# Patient Record
Sex: FEMALE | Race: AMERICAN INDIAN OR ALASKA NATIVE
[De-identification: names, ages, dates, MRNs, and addresses within clinical notes are randomized per-mention and may not be internally consistent; named-entity substitution may affect disease eponyms.]

---

## 2019-07-29 ENCOUNTER — HOSPITAL ENCOUNTER (EMERGENCY)
Dept: HOSPITAL 5 - ED | Age: 59
Discharge: HOME | End: 2019-07-29
Payer: SELF-PAY

## 2019-07-29 VITALS — SYSTOLIC BLOOD PRESSURE: 123 MMHG | DIASTOLIC BLOOD PRESSURE: 70 MMHG

## 2019-07-29 DIAGNOSIS — E11.65: Primary | ICD-10-CM

## 2019-07-29 LAB
BASOPHILS # (AUTO): 0.1 K/MM3 (ref 0–0.1)
BASOPHILS NFR BLD AUTO: 1.2 % (ref 0–1.8)
BILIRUB UR QL STRIP: (no result)
BLOOD UR QL VISUAL: (no result)
BUN SERPL-MCNC: 29 MG/DL (ref 7–17)
BUN/CREAT SERPL: 26 %
CALCIUM SERPL-MCNC: 8.7 MG/DL (ref 8.4–10.2)
EOSINOPHIL # BLD AUTO: 0 K/MM3 (ref 0–0.4)
EOSINOPHIL NFR BLD AUTO: 0.2 % (ref 0–4.3)
HCT VFR BLD CALC: 38.9 % (ref 30.3–42.9)
HEMOLYSIS INDEX: 13
HGB BLD-MCNC: 12.8 GM/DL (ref 10.1–14.3)
LYMPHOCYTES # BLD AUTO: 1.4 K/MM3 (ref 1.2–5.4)
LYMPHOCYTES NFR BLD AUTO: 29.6 % (ref 13.4–35)
MCHC RBC AUTO-ENTMCNC: 33 % (ref 30–34)
MCV RBC AUTO: 85 FL (ref 79–97)
MONOCYTES # (AUTO): 0.3 K/MM3 (ref 0–0.8)
MONOCYTES % (AUTO): 7.1 % (ref 0–7.3)
MUCOUS THREADS #/AREA URNS HPF: (no result) /HPF
PH UR STRIP: 6 [PH] (ref 5–7)
PLATELET # BLD: 256 K/MM3 (ref 140–440)
PROT UR STRIP-MCNC: (no result) MG/DL
RBC # BLD AUTO: 4.6 M/MM3 (ref 3.65–5.03)
RBC #/AREA URNS HPF: < 1 /HPF (ref 0–6)
UROBILINOGEN UR-MCNC: < 2 MG/DL (ref ?–2)
WBC #/AREA URNS HPF: 1 /HPF (ref 0–6)

## 2019-07-29 PROCEDURE — 96361 HYDRATE IV INFUSION ADD-ON: CPT

## 2019-07-29 PROCEDURE — 82805 BLOOD GASES W/O2 SATURATION: CPT

## 2019-07-29 PROCEDURE — 80048 BASIC METABOLIC PNL TOTAL CA: CPT

## 2019-07-29 PROCEDURE — 85025 COMPLETE CBC W/AUTO DIFF WBC: CPT

## 2019-07-29 PROCEDURE — 99283 EMERGENCY DEPT VISIT LOW MDM: CPT

## 2019-07-29 PROCEDURE — 82962 GLUCOSE BLOOD TEST: CPT

## 2019-07-29 PROCEDURE — 81001 URINALYSIS AUTO W/SCOPE: CPT

## 2019-07-29 PROCEDURE — 96374 THER/PROPH/DIAG INJ IV PUSH: CPT

## 2019-07-29 PROCEDURE — 96376 TX/PRO/DX INJ SAME DRUG ADON: CPT

## 2019-07-29 PROCEDURE — 36415 COLL VENOUS BLD VENIPUNCTURE: CPT

## 2019-07-29 NOTE — EMERGENCY DEPARTMENT REPORT
ED General Adult HPI





- General


Chief complaint: Hyperglycemia


Stated complaint: HIGH SUGAR LEVEL


Time Seen by Provider: 07/29/19 13:45


Source: patient


Mode of arrival: Ambulatory


Limitations: No Limitations





- History of Present Illness


Initial comments: 





Disposition of a history of diabetes presents to ED with elevated glucose.  

Patient states her sister checked her glucose yesterday and it was elevated.  

Patient reports she has not been on any medication in one year because she does 

not have a physician currently.


-: days(s) (1)


Improves with: none


Worsens with: none


Associated Symptoms: denies: chest pain, fever/chills, nausea/vomiting, 

shortness of breath





- Related Data


                                  Previous Rx's











 Medication  Instructions  Recorded  Last Taken  Type


 


metFORMIN [Glucophage] 500 mg PO BID #60 tablet 07/29/19 Unknown Rx











                                    Allergies











Allergy/AdvReac Type Severity Reaction Status Date / Time


 


No Known Allergies Allergy   Unverified 07/29/19 13:41














ED Review of Systems


ROS: 


Stated complaint: HIGH SUGAR LEVEL


Other details as noted in HPI





Comment: All other systems reviewed and negative


Constitutional: denies: chills, fever


Respiratory: denies: cough, shortness of breath


Cardiovascular: denies: chest pain


Gastrointestinal: denies: abdominal pain, nausea, vomiting





ED Past Medical Hx





- Past Medical History


Hx Diabetes: Yes





- Social History


Smoking Status: Never Smoker


Substance Use Type: None





- Medications


Home Medications: 


                                Home Medications











 Medication  Instructions  Recorded  Confirmed  Last Taken  Type


 


metFORMIN [Glucophage] 500 mg PO BID #60 tablet 07/29/19  Unknown Rx














ED Physical Exam





- General


Limitations: No Limitations


General appearance: alert, in no apparent distress





- Head


Head exam: Present: atraumatic, normocephalic





- Eye


Eye exam: Present: normal appearance, PERRL, EOMI





- ENT


ENT exam: Present: mucous membranes moist





- Neck


Neck exam: Present: normal inspection





- Respiratory


Respiratory exam: Present: normal lung sounds bilaterally.  Absent: respiratory 

distress





- Cardiovascular


Cardiovascular Exam: Present: normal rhythm, tachycardia





- GI/Abdominal


GI/Abdominal exam: Present: soft.  Absent: distended, tenderness





- Extremities Exam


Extremities exam: Present: normal inspection





- Neurological Exam


Neurological exam: Present: alert, oriented X3





- Psychiatric


Psychiatric exam: Present: normal affect, normal mood





- Skin


Skin exam: Present: warm, dry, intact, normal color





ED Course


                                   Vital Signs











  07/29/19 07/29/19 07/29/19





  13:46 17:50 20:57


 


Temperature 98.3 F 98.2 F 


 


Pulse Rate 105 H 94 H 89


 


Respiratory 18 16 16





Rate   


 


Blood Pressure 145/85  


 


Blood Pressure  133/84 123/70





[Left]   


 


O2 Sat by Pulse 98 98 99





Oximetry   














ED Medical Decision Making





- Lab Data


Result diagrams: 


                                 07/29/19 14:03





                                 07/29/19 14:03





- Medical Decision Making





58-year-old female with a history of diabetes presents to ED with hyperglycemia.

  Glucose of 602, however patient not DKA.  Potassium was mildly elevated at 

5.3.  Patient was given a total of 12 units of insulin for correction of 

potassium and glucose.  IV fluids given as well.  Repeat glucose 273.  Patient 

reports she was previously on insulin, however, that was more than one year ago.

  Will place patient on metformin twice a day at this time.  Outpatient follow-

up advised.  Return precautions given.





- Differential Diagnosis


hyperglycemia, DKA


Critical care attestation.: 


If time is entered above; I have spent that time in minutes in the direct care 

of this critically ill patient, excluding procedure time.








ED Disposition


Clinical Impression: 


 Hyperglycemia due to type 2 diabetes mellitus





Disposition: DC-01 TO HOME OR SELFCARE


Is pt being admited?: No


Condition: Stable


Instructions:  Diabetes Mellitus Type 2 in Adults (ED)


Prescriptions: 


metFORMIN [Glucophage] 500 mg PO BID #60 tablet


Referrals: 


[facility] [place] [Primary Care Provider] - 3-5 Days


[facility] [Outside] - 3-5 Days


Time of Disposition: 20:19

## 2019-07-29 NOTE — EMERGENCY DEPARTMENT REPORT
Blank Doc





- Documentation


Documentation: 





This is a 58-year-old female that presents with uncontrolled hyperglycemia.





This initial assessment/diagnostic orders/clinical plan/treatment(s) is/are 

subject to change based on patient's health status, clinical progression and re-

assessment by fellow clinical providers in the ED.  Further treatment and workup

at subsequent clinical providers discretion.  Patient/guardians urged not to 

elope from the ED as their condition may be serious if not clinically assessed 

and managed.  Initial orders include:


1- Patient sent to main ed for further evaluation and treatment


2- labs


3- UA

## 2020-10-14 ENCOUNTER — HOSPITAL ENCOUNTER (EMERGENCY)
Dept: HOSPITAL 5 - ED | Age: 60
Discharge: HOME | End: 2020-10-14
Payer: SELF-PAY

## 2020-10-14 VITALS — DIASTOLIC BLOOD PRESSURE: 89 MMHG | SYSTOLIC BLOOD PRESSURE: 172 MMHG

## 2020-10-14 DIAGNOSIS — E86.0: ICD-10-CM

## 2020-10-14 DIAGNOSIS — E11.65: Primary | ICD-10-CM

## 2020-10-14 DIAGNOSIS — E87.5: ICD-10-CM

## 2020-10-14 DIAGNOSIS — I10: ICD-10-CM

## 2020-10-14 LAB
BASOPHILS # (AUTO): 0 K/MM3 (ref 0–0.1)
BASOPHILS NFR BLD AUTO: 0.3 % (ref 0–1.8)
BUN SERPL-MCNC: 17 MG/DL (ref 7–17)
BUN/CREAT SERPL: 15 %
CALCIUM SERPL-MCNC: 9.6 MG/DL (ref 8.4–10.2)
EOSINOPHIL # BLD AUTO: 0 K/MM3 (ref 0–0.4)
EOSINOPHIL NFR BLD AUTO: 0.4 % (ref 0–4.3)
HCT VFR BLD CALC: 38.7 % (ref 30.3–42.9)
HEMOLYSIS INDEX: 3
HGB BLD-MCNC: 12.5 GM/DL (ref 10.1–14.3)
LYMPHOCYTES # BLD AUTO: 1.5 K/MM3 (ref 1.2–5.4)
LYMPHOCYTES NFR BLD AUTO: 24.6 % (ref 13.4–35)
MCHC RBC AUTO-ENTMCNC: 32 % (ref 30–34)
MCV RBC AUTO: 84 FL (ref 79–97)
MONOCYTES # (AUTO): 0.4 K/MM3 (ref 0–0.8)
MONOCYTES % (AUTO): 6.2 % (ref 0–7.3)
PLATELET # BLD: 228 K/MM3 (ref 140–440)
RBC # BLD AUTO: 4.59 M/MM3 (ref 3.65–5.03)

## 2020-10-14 PROCEDURE — 99283 EMERGENCY DEPT VISIT LOW MDM: CPT

## 2020-10-14 PROCEDURE — 80048 BASIC METABOLIC PNL TOTAL CA: CPT

## 2020-10-14 PROCEDURE — 85025 COMPLETE CBC W/AUTO DIFF WBC: CPT

## 2020-10-14 PROCEDURE — 36415 COLL VENOUS BLD VENIPUNCTURE: CPT

## 2020-10-14 PROCEDURE — 96361 HYDRATE IV INFUSION ADD-ON: CPT

## 2020-10-14 PROCEDURE — 96374 THER/PROPH/DIAG INJ IV PUSH: CPT

## 2020-10-14 PROCEDURE — 82962 GLUCOSE BLOOD TEST: CPT

## 2020-10-14 NOTE — EMERGENCY DEPARTMENT REPORT
ED General Adult HPI





- General


Chief complaint: Hyperglycemia


Stated complaint: hyperglycemia


PUI?: No


Time Seen by Provider: 10/14/20 16:15


Source: patient


Mode of arrival: Ambulatory


Limitations: No Limitations





- History of Present Illness


Initial comments: 





Patient is a 59-year-old female that presents emergency with complaints of 

hyperglycemia.  Patient states that she ran out of medications for her diabetes 

and blood pressure 7 months ago.  Patient states her primary care's office 

closed due to COVID-19.  Patient states that her sugar and blood pressure have 

been going up steadily today.  Patient states that she is having frequent 

urination and thirst.  Patient denies chest pain or shortness of breath.  

Patient denies any physical pain.  Patient denies any physical complaints except

for frequent urination and thirst.  Patient denies diaphoresis.  Patient denies 

syncopal episode.  Patient denies headache.  Patient denies blurry vision.








Patient denies recent travel.  Patient denies recent international travel.  

Patient denies exposure to the novel coronavirus.  Patient denies sick contacts.

 Patient denies fever and chills.  Patient denies cough.  Patient denies 

diarrhea.  Patient denies coming in contact with anybody with symptoms of the 

novel coronavirus.








-: Sudden


Consistency: constant


Improves with: medication, rest


Worsens with: eating


Associated Symptoms: denies other symptoms.  denies: confusion, chest pain, 

cough, diaphoresis, fever/chills, headaches, loss of appetite, malaise, 

nausea/vomiting, rash, seizure, shortness of breath, syncope, weakness


Treatments Prior to Arrival: none





- Related Data


                                  Previous Rx's











 Medication  Instructions  Recorded  Last Taken  Type


 


Amlodipine Besylate [Norvasc] 5 mg PO DAILY #30 tablet 10/14/20 Unknown Rx


 


metFORMIN [Glucophage] 500 mg PO BID 15 Days #30 tablet 10/14/20 Unknown Rx











                                    Allergies











Allergy/AdvReac Type Severity Reaction Status Date / Time


 


No Known Allergies Allergy   Unverified 07/29/19 13:41














ED Review of Systems


ROS: 


Stated complaint: PAIN


Other details as noted in HPI





Constitutional: denies: chills, fever


Eyes: denies: eye pain, eye discharge, vision change


ENT: denies: ear pain, throat pain


Respiratory: denies: cough, shortness of breath, wheezing


Cardiovascular: denies: chest pain, palpitations


Endocrine: no symptoms reported, increased thirst, increased urine


Gastrointestinal: denies: abdominal pain, nausea, diarrhea


Genitourinary: denies: urgency, dysuria, discharge


Musculoskeletal: denies: back pain, joint swelling, arthralgia


Skin: denies: rash, lesions


Neurological: denies: headache, weakness, paresthesias


Psychiatric: denies: anxiety, depression


Hematological/Lymphatic: denies: easy bleeding, easy bruising





ED Past Medical Hx





- Past Medical History


Previous Medical History?: Yes


Hx Hypertension: Yes


Hx Diabetes: Yes





- Surgical History


Past Surgical History?: No





- Family History


Family history: no significant





- Social History


Smoking Status: Never Smoker


Substance Use Type: None





- Medications


Home Medications: 


                                Home Medications











 Medication  Instructions  Recorded  Confirmed  Last Taken  Type


 


Amlodipine Besylate [Norvasc] 5 mg PO DAILY #30 tablet 10/14/20  Unknown Rx


 


metFORMIN [Glucophage] 500 mg PO BID 15 Days #30 tablet 10/14/20  Unknown Rx














ED Physical Exam





- General


Limitations: No Limitations


General appearance: alert, in no apparent distress





- Head


Head exam: Present: atraumatic, normocephalic





- Eye


Eye exam: Present: normal appearance, PERRL


Pupils: Present: normal accommodation





- ENT


ENT exam: Present: mucous membranes dry





- Neck


Neck exam: Present: normal inspection





- Respiratory


Respiratory exam: Present: normal lung sounds bilaterally.  Absent: respiratory 

distress, wheezes, rales





- Cardiovascular


Cardiovascular Exam: Present: regular rate, normal rhythm.  Absent: systolic 

murmur, diastolic murmur, rubs, gallop





- GI/Abdominal


GI/Abdominal exam: Present: soft, normal bowel sounds.  Absent: distended, 

tenderness, guarding





- Rectal


Rectal exam: Present: deferred





- Extremities Exam


Extremities exam: Present: normal inspection





- Back Exam


Back exam: Present: normal inspection





- Neurological Exam


Neurological exam: Present: alert, oriented X3





- Psychiatric


Psychiatric exam: Present: normal affect, normal mood





- Skin


Skin exam: Present: warm, dry, intact, normal color.  Absent: rash





ED Course


                                   Vital Signs











  10/14/20





  13:01


 


Temperature 98.0 F


 


Pulse Rate 111 H


 


Respiratory 20





Rate 


 


Blood Pressure 172/89





[Left] 


 


O2 Sat by Pulse 98





Oximetry 














- Reevaluation(s)


Reevaluation #1: 


Initial evaluation done.  Patient is unknown sure of her medication that she is 

on except for Metformin.  Patient will be given saline, insulin.  An IV will 

placed by the nurse.


10/14/20 16:28











Reevaluation #2: 


Patient states she is feeling better.  Patient's blood pressure just prior to 

discharge 160/80.  Patient's heart rate is 86.





I discussed all results and clinical findings with patient.  I discussed plan of

 care with patient.  Patient agrees with plan of care.  Patient is stable for 

discharge.  Patient will be discharged home.  Patient given discharge 

instructions.  Patient voiced understanding of discharge instructions.


10/14/20 17:59














ED Medical Decision Making





- Lab Data


Result diagrams: 


                                 10/14/20 14:10





                                 10/14/20 14:10





- Medical Decision Making





Patient is a 59-year-old female that presents emergency room with complaints of 

hyperglycemia and hypertension.  Patient states she was out of her medications 

6+ months.  Patient had labs done.  Patient had no other physical complaints 

except for the elevated sugar. Patient's labs are consistent with dehydration, 

hyperglycemia and hyperkalemia.  Patient CBC was normal.  Patient given fluids, 

insulin.  Patient's sugar responded well.  Patient states she felt better after 

treatment.  Patient stable for discharge.  Patient discharged home.  Patient 

given refill of her medications.  Patient given a refill of her Metformin and 

described Norvasc for her blood pressure.





- Differential Diagnosis


hyperglycemia, noncompliance, diabetes, hypertension


Critical care attestation.: 


If time is entered above; I have spent that time in minutes in the direct care 

of this critically ill patient, excluding procedure time.








ED Disposition


Clinical Impression: 


 Hyperglycemia, Noncompliance, Dehydration, Hyperkalemia





Diabetes type 2, uncontrolled


Qualifiers:


 Glycemic state: with hyperglycemia Qualified Code(s): E11.65 - Type 2 diabetes 

mellitus with hyperglycemia





Hypertension


Qualifiers:


 Hypertension type: essential hypertension Qualified Code(s): I10 - Essential 

(primary) hypertension





Disposition: DC-01 TO HOME OR SELFCARE


Is pt being admited?: No


Does the pt Need Aspirin: No


Condition: Stable


Instructions:  Heart Healthy Diet (ED), How to Take a Blood Pressure  (ED), How 

to Check Your Blood Sugar (ED), Diabetes Mellitus Type 2 in Adults (ED), DASH 

Eating Plan (ED), Low Sodium Diet (ED), Hypertension (ED)


Additional Instructions: 


Patient to follow-up with primary care in 2 to 3 days.  Patient to follow-up 

with endocrine in 2 to 3 days.  Patient to rest.  Patient to increase water.   

Patient to eat a diabetic diet.  Patient to take all meds as directed.  Patient 

to monitor blood sugar.  Patient to monitor blood pressure.  Patient to keep a 

blood sugar and a blood pressure log.  Patient to take blood sugar and blood 

pressure log to follow-up appointments.  Patient to eat a heart healthy and low-

salt diet.  Patient to take meds as directed.  Patient to return to the ER if 

condition worsens, changes or new symptoms arise..


Prescriptions: 


metFORMIN [Glucophage] 500 mg PO BID 15 Days #30 tablet


Amlodipine Besylate [Norvasc] 5 mg PO DAILY #30 tablet


Referrals: 


[facility] [Primary Care Provider] - 2-3 Days


Time of Disposition: 17:50

## 2021-03-06 ENCOUNTER — HOSPITAL ENCOUNTER (EMERGENCY)
Dept: HOSPITAL 5 - ED | Age: 61
Discharge: HOME | End: 2021-03-06
Payer: SELF-PAY

## 2021-03-06 VITALS — DIASTOLIC BLOOD PRESSURE: 86 MMHG | SYSTOLIC BLOOD PRESSURE: 135 MMHG

## 2021-03-06 DIAGNOSIS — E11.65: Primary | ICD-10-CM

## 2021-03-06 DIAGNOSIS — Z91.14: ICD-10-CM

## 2021-03-06 DIAGNOSIS — Z79.899: ICD-10-CM

## 2021-03-06 DIAGNOSIS — I10: ICD-10-CM

## 2021-03-06 LAB
ALBUMIN SERPL-MCNC: 4.4 G/DL (ref 3.9–5)
ALT SERPL-CCNC: 14 UNITS/L (ref 7–56)
APTT BLD: 25.6 SEC. (ref 24.2–36.6)
BASOPHILS # (AUTO): 0.1 K/MM3 (ref 0–0.1)
BASOPHILS NFR BLD AUTO: 1 % (ref 0–1.8)
BILIRUB UR QL STRIP: (no result)
BLOOD UR QL VISUAL: (no result)
BUN SERPL-MCNC: 19 MG/DL (ref 7–17)
BUN/CREAT SERPL: 17 %
CALCIUM SERPL-MCNC: 9.3 MG/DL (ref 8.4–10.2)
EOSINOPHIL # BLD AUTO: 0 K/MM3 (ref 0–0.4)
EOSINOPHIL NFR BLD AUTO: 0 % (ref 0–4.3)
HCT VFR BLD CALC: 35 % (ref 30.3–42.9)
HEMOLYSIS INDEX: 11
HGB BLD-MCNC: 11.3 GM/DL (ref 10.1–14.3)
INR PPP: 0.93 (ref 0.87–1.13)
LYMPHOCYTES # BLD AUTO: 0.8 K/MM3 (ref 1.2–5.4)
LYMPHOCYTES NFR BLD AUTO: 12.6 % (ref 13.4–35)
MCHC RBC AUTO-ENTMCNC: 32 % (ref 30–34)
MCV RBC AUTO: 86 FL (ref 79–97)
MONOCYTES # (AUTO): 0.4 K/MM3 (ref 0–0.8)
MONOCYTES % (AUTO): 6.5 % (ref 0–7.3)
PH UR STRIP: 6 [PH] (ref 5–7)
PLATELET # BLD: 369 K/MM3 (ref 140–440)
RBC # BLD AUTO: 4.05 M/MM3 (ref 3.65–5.03)
RBC #/AREA URNS HPF: 1 /HPF (ref 0–6)
UROBILINOGEN UR-MCNC: < 2 MG/DL (ref ?–2)
WBC #/AREA URNS HPF: 1 /HPF (ref 0–6)

## 2021-03-06 PROCEDURE — 85730 THROMBOPLASTIN TIME PARTIAL: CPT

## 2021-03-06 PROCEDURE — 82010 KETONE BODYS QUAN: CPT

## 2021-03-06 PROCEDURE — 82962 GLUCOSE BLOOD TEST: CPT

## 2021-03-06 PROCEDURE — 96374 THER/PROPH/DIAG INJ IV PUSH: CPT

## 2021-03-06 PROCEDURE — 81001 URINALYSIS AUTO W/SCOPE: CPT

## 2021-03-06 PROCEDURE — 85025 COMPLETE CBC W/AUTO DIFF WBC: CPT

## 2021-03-06 PROCEDURE — 84484 ASSAY OF TROPONIN QUANT: CPT

## 2021-03-06 PROCEDURE — 71045 X-RAY EXAM CHEST 1 VIEW: CPT

## 2021-03-06 PROCEDURE — 85610 PROTHROMBIN TIME: CPT

## 2021-03-06 PROCEDURE — 93005 ELECTROCARDIOGRAM TRACING: CPT

## 2021-03-06 PROCEDURE — 82805 BLOOD GASES W/O2 SATURATION: CPT

## 2021-03-06 PROCEDURE — 99284 EMERGENCY DEPT VISIT MOD MDM: CPT

## 2021-03-06 PROCEDURE — 96361 HYDRATE IV INFUSION ADD-ON: CPT

## 2021-03-06 PROCEDURE — 80053 COMPREHEN METABOLIC PANEL: CPT

## 2021-03-06 PROCEDURE — 96375 TX/PRO/DX INJ NEW DRUG ADDON: CPT

## 2021-03-06 PROCEDURE — 36415 COLL VENOUS BLD VENIPUNCTURE: CPT

## 2021-03-06 NOTE — EMERGENCY DEPARTMENT REPORT
HPI





- General


Chief Complaint: Hyperglycemia


Time Seen by Provider: 03/06/21 14:48





- HPI


HPI: 





This is a 60-year-old [place] female who presents to the emergency department, 

sent in by a physician at the [facility], secondary to elevated blood 

pressure and blood sugar.  The patient does not have a normal primary care 

physician for which she follows up on a yearly basis.  The patient does have a 

history of non-insulin-dependent diabetes for which she was previously on 

Metformin 500 mg twice daily.  She admits to medication noncompliance since 

January.  She went into the clinic today and was found to have a blood sugar 

above 500 and was given 1000 mg of Metformin.  They rechecked her blood sugar 

about 1.5 hours later and the patient was still hyperglycemic.  The triage note 

says that the patient had been complaining of chest pain for the past 2 weeks.  

When I asked her about this she says that it resolved and "I just said that so I

would be tended to."  She denies any fever, headache, shortness of breath, 

nausea, vomiting.





ED Past Medical Hx





- Past Medical History


Hx Hypertension: Yes


Hx Diabetes: Yes





- Social History


Smoking Status: Never Smoker


Substance Use Type: None





- Medications


Home Medications: 


                                Home Medications











 Medication  Instructions  Recorded  Confirmed  Last Taken  Type


 


Amlodipine Besylate [Norvasc] 5 mg PO DAILY #30 tablet 03/06/21  Unknown Rx


 


metFORMIN [Glucophage] 500 mg PO BID #60 tablet 03/06/21  Unknown Rx














ED Review of Systems


ROS: 


Stated complaint: SENT BY DR KILGORE/BS ELEVATED


Other details as noted in HPI





Comment: All other systems reviewed and negative


Constitutional: denies: chills, fever


Eyes: denies: eye pain, vision change


ENT: denies: ear pain, throat pain


Respiratory: denies: cough, shortness of breath


Cardiovascular: denies: palpitations, edema


Gastrointestinal: denies: nausea, vomiting


Genitourinary: denies: dysuria, discharge


Musculoskeletal: denies: back pain, arthralgia


Skin: denies: rash, lesions


Neurological: denies: headache, weakness





Physical Exam





- Physical Exam


Vital Signs: 


                                   Vital Signs











  03/06/21 03/06/21





  14:19 14:23


 


Temperature  98 F


 


Pulse Rate 118 H 


 


Respiratory 20 





Rate  


 


Blood Pressure 201/105 


 


O2 Sat by Pulse 98 





Oximetry  











Physical Exam: 





GENERAL: The patient is well-developed well-nourished.


HENT: Normocephalic.  Atraumatic.    Patient has moist mucous membranes.


EYES: Extraocular motions are intact.  


NECK: Supple. Trachea is midline.


CHEST/LUNGS: Clear to auscultation.  There is no respiratory distress noted.


HEART/CARDIOVASCULAR: Regular.  There is mild tachycardia.  There is no murmur.


ABDOMEN: Abdomen is soft, nontender.  Patient has normal bowel sounds.  There is

 no abdominal distention.


SKIN: Skin is warm and dry. 


NEURO: The patient is awake, alert, and oriented.  The patient is cooperative.  

The patient has no focal neurologic deficits.  Normal speech.


MUSCULOSKELETAL: There is no tenderness or deformity.  There is no limitation 

range of motion.  





ED Course


                                   Vital Signs











  03/06/21 03/06/21





  14:19 14:23


 


Temperature  98 F


 


Pulse Rate 118 H 


 


Respiratory 20 





Rate  


 


Blood Pressure 201/105 


 


O2 Sat by Pulse 98 





Oximetry  














ED Medical Decision Making





- Lab Data


Result diagrams: 


                                 03/06/21 14:56





                                 03/06/21 14:56





                                   Lab Results











  03/06/21 03/06/21 03/06/21 Range/Units





  14:22 14:56 14:56 


 


WBC   6.3   (4.5-11.0)  K/mm3


 


RBC   4.05   (3.65-5.03)  M/mm3


 


Hgb   11.3   (10.1-14.3)  gm/dl


 


Hct   35.0   (30.3-42.9)  %


 


MCV   86   (79-97)  fl


 


MCH   28   (28-32)  pg


 


MCHC   32   (30-34)  %


 


RDW   15.0   (13.2-15.2)  %


 


Plt Count   369   (140-440)  K/mm3


 


Lymph % (Auto)   12.6 L   (13.4-35.0)  %


 


Mono % (Auto)   6.5   (0.0-7.3)  %


 


Eos % (Auto)   0.0   (0.0-4.3)  %


 


Baso % (Auto)   1.0   (0.0-1.8)  %


 


Lymph # (Auto)   0.8 L   (1.2-5.4)  K/mm3


 


Mono # (Auto)   0.4   (0.0-0.8)  K/mm3


 


Eos # (Auto)   0.0   (0.0-0.4)  K/mm3


 


Baso # (Auto)   0.1   (0.0-0.1)  K/mm3


 


Seg Neutrophils %   79.9 H   (40.0-70.0)  %


 


Seg Neutrophils #   5.1   (1.8-7.7)  K/mm3


 


PT     (12.2-14.9)  Sec.


 


INR     (0.87-1.13)  


 


APTT     (24.2-36.6)  Sec.


 


VBG pH     (7.320-7.420)  


 


Sodium    129 L  (137-145)  mmol/L


 


Potassium    4.5  (3.6-5.0)  mmol/L


 


Chloride    89.7 L  ()  mmol/L


 


Carbon Dioxide    23  (22-30)  mmol/L


 


Anion Gap    21  mmol/L


 


BUN    19 H  (7-17)  mg/dL


 


Creatinine    1.1  (0.6-1.2)  mg/dL


 


Estimated GFR    > 60  ml/min


 


BUN/Creatinine Ratio    17  %


 


Glucose    618 H*  ()  mg/dL


 


POC Glucose  > 600 H    ()  mg/dL


 


Ketones Quantitative    Negative  (Negative)  


 


Calcium    9.3  (8.4-10.2)  mg/dL


 


Total Bilirubin    0.30  (0.1-1.2)  mg/dL


 


AST    13  (5-40)  units/L


 


ALT    14  (7-56)  units/L


 


Alkaline Phosphatase    119  ()  units/L


 


Troponin T     (0.00-0.029)  ng/mL


 


Total Protein    7.8  (6.3-8.2)  g/dL


 


Albumin    4.4  (3.9-5)  g/dL


 


Albumin/Globulin Ratio    1.3  %


 


Urine Color     (Yellow)  


 


Urine Turbidity     (Clear)  


 


Urine pH     (5.0-7.0)  


 


Ur Specific Gravity     (1.003-1.030)  


 


Urine Protein     (Negative)  mg/dL


 


Urine Glucose (UA)     (Negative)  mg/dL


 


Urine Ketones     (Negative)  mg/dL


 


Urine Blood     (Negative)  


 


Urine Nitrite     (Negative)  


 


Urine Bilirubin     (Negative)  


 


Urine Urobilinogen     (<2.0)  mg/dL


 


Ur Leukocyte Esterase     (Negative)  


 


Urine WBC (Auto)     (0.0-6.0)  /HPF


 


Urine RBC (Auto)     (0.0-6.0)  /HPF


 


U Epithel Cells (Auto)     (0-13.0)  /HPF














  03/06/21 03/06/21 03/06/21 Range/Units





  14:56 15:25 15:25 


 


WBC     (4.5-11.0)  K/mm3


 


RBC     (3.65-5.03)  M/mm3


 


Hgb     (10.1-14.3)  gm/dl


 


Hct     (30.3-42.9)  %


 


MCV     (79-97)  fl


 


MCH     (28-32)  pg


 


MCHC     (30-34)  %


 


RDW     (13.2-15.2)  %


 


Plt Count     (140-440)  K/mm3


 


Lymph % (Auto)     (13.4-35.0)  %


 


Mono % (Auto)     (0.0-7.3)  %


 


Eos % (Auto)     (0.0-4.3)  %


 


Baso % (Auto)     (0.0-1.8)  %


 


Lymph # (Auto)     (1.2-5.4)  K/mm3


 


Mono # (Auto)     (0.0-0.8)  K/mm3


 


Eos # (Auto)     (0.0-0.4)  K/mm3


 


Baso # (Auto)     (0.0-0.1)  K/mm3


 


Seg Neutrophils %     (40.0-70.0)  %


 


Seg Neutrophils #     (1.8-7.7)  K/mm3


 


PT   12.3   (12.2-14.9)  Sec.


 


INR   0.93   (0.87-1.13)  


 


APTT   25.6   (24.2-36.6)  Sec.


 


VBG pH  7.296 L    (7.320-7.420)  


 


Sodium     (137-145)  mmol/L


 


Potassium     (3.6-5.0)  mmol/L


 


Chloride     ()  mmol/L


 


Carbon Dioxide     (22-30)  mmol/L


 


Anion Gap     mmol/L


 


BUN     (7-17)  mg/dL


 


Creatinine     (0.6-1.2)  mg/dL


 


Estimated GFR     ml/min


 


BUN/Creatinine Ratio     %


 


Glucose     ()  mg/dL


 


POC Glucose     ()  mg/dL


 


Ketones Quantitative     (Negative)  


 


Calcium     (8.4-10.2)  mg/dL


 


Total Bilirubin     (0.1-1.2)  mg/dL


 


AST     (5-40)  units/L


 


ALT     (7-56)  units/L


 


Alkaline Phosphatase     ()  units/L


 


Troponin T    < 0.010  (0.00-0.029)  ng/mL


 


Total Protein     (6.3-8.2)  g/dL


 


Albumin     (3.9-5)  g/dL


 


Albumin/Globulin Ratio     %


 


Urine Color     (Yellow)  


 


Urine Turbidity     (Clear)  


 


Urine pH     (5.0-7.0)  


 


Ur Specific Gravity     (1.003-1.030)  


 


Urine Protein     (Negative)  mg/dL


 


Urine Glucose (UA)     (Negative)  mg/dL


 


Urine Ketones     (Negative)  mg/dL


 


Urine Blood     (Negative)  


 


Urine Nitrite     (Negative)  


 


Urine Bilirubin     (Negative)  


 


Urine Urobilinogen     (<2.0)  mg/dL


 


Ur Leukocyte Esterase     (Negative)  


 


Urine WBC (Auto)     (0.0-6.0)  /HPF


 


Urine RBC (Auto)     (0.0-6.0)  /HPF


 


U Epithel Cells (Auto)     (0-13.0)  /HPF














  03/06/21 03/06/21 03/06/21 Range/Units





  17:37 18:24 18:32 


 


WBC     (4.5-11.0)  K/mm3


 


RBC     (3.65-5.03)  M/mm3


 


Hgb     (10.1-14.3)  gm/dl


 


Hct     (30.3-42.9)  %


 


MCV     (79-97)  fl


 


MCH     (28-32)  pg


 


MCHC     (30-34)  %


 


RDW     (13.2-15.2)  %


 


Plt Count     (140-440)  K/mm3


 


Lymph % (Auto)     (13.4-35.0)  %


 


Mono % (Auto)     (0.0-7.3)  %


 


Eos % (Auto)     (0.0-4.3)  %


 


Baso % (Auto)     (0.0-1.8)  %


 


Lymph # (Auto)     (1.2-5.4)  K/mm3


 


Mono # (Auto)     (0.0-0.8)  K/mm3


 


Eos # (Auto)     (0.0-0.4)  K/mm3


 


Baso # (Auto)     (0.0-0.1)  K/mm3


 


Seg Neutrophils %     (40.0-70.0)  %


 


Seg Neutrophils #     (1.8-7.7)  K/mm3


 


PT     (12.2-14.9)  Sec.


 


INR     (0.87-1.13)  


 


APTT     (24.2-36.6)  Sec.


 


VBG pH     (7.320-7.420)  


 


Sodium     (137-145)  mmol/L


 


Potassium     (3.6-5.0)  mmol/L


 


Chloride     ()  mmol/L


 


Carbon Dioxide     (22-30)  mmol/L


 


Anion Gap     mmol/L


 


BUN     (7-17)  mg/dL


 


Creatinine     (0.6-1.2)  mg/dL


 


Estimated GFR     ml/min


 


BUN/Creatinine Ratio     %


 


Glucose     ()  mg/dL


 


POC Glucose  304 H  241 H   ()  mg/dL


 


Ketones Quantitative     (Negative)  


 


Calcium     (8.4-10.2)  mg/dL


 


Total Bilirubin     (0.1-1.2)  mg/dL


 


AST     (5-40)  units/L


 


ALT     (7-56)  units/L


 


Alkaline Phosphatase     ()  units/L


 


Troponin T     (0.00-0.029)  ng/mL


 


Total Protein     (6.3-8.2)  g/dL


 


Albumin     (3.9-5)  g/dL


 


Albumin/Globulin Ratio     %


 


Urine Color    Straw  (Yellow)  


 


Urine Turbidity    Clear  (Clear)  


 


Urine pH    6.0  (5.0-7.0)  


 


Ur Specific Gravity    1.016  (1.003-1.030)  


 


Urine Protein    100 mg/dl  (Negative)  mg/dL


 


Urine Glucose (UA)    >=500  (Negative)  mg/dL


 


Urine Ketones    Tr  (Negative)  mg/dL


 


Urine Blood    Sm  (Negative)  


 


Urine Nitrite    Neg  (Negative)  


 


Urine Bilirubin    Neg  (Negative)  


 


Urine Urobilinogen    < 2.0  (<2.0)  mg/dL


 


Ur Leukocyte Esterase    Neg  (Negative)  


 


Urine WBC (Auto)    1.0  (0.0-6.0)  /HPF


 


Urine RBC (Auto)    1.0  (0.0-6.0)  /HPF


 


U Epithel Cells (Auto)    < 1.0  (0-13.0)  /HPF














- Radiology Data


Radiology results: image reviewed


interpreted by me: 





Chest x-ray does not show any acute process.  There are no pleural effusions, 

obvious pneumonia and there is no pneumothorax. No significant cardiomegaly.





- Medical Decision Making





This patient was sent in from a local clinic secondary to hyperglycemia.  The 

patient admits to medication noncompliance with her Metformin and with her 

antihypertensive medications.  Initially, through triage, the patient had 

mentioned something about chest pain.  However upon my initial examination the 

patient says that she may have had chest pain in the past but nothing recently 

and she admits that she said this in order to get back and be seen as soon as 

possible.





Heart and lung sounds are normal to auscultation.  Chest x-ray did not show any 

pneumonia, pleural effusions, pneumothorax, or any acute process.





The patient's labs were abnormal with a serum blood sugar of about 600.  The 

patient did have some venous acidosis but there was no elevated anion gap or 

serum ketones.  The patient had some pseudohyponatremia secondary to the 

hyperglycemia.





Patient was given 2 L of IV fluid resuscitation and a dose of IV insulin.  Over 

the next 2 hours or so the patient's blood sugar came down to about 240 on Accu-

Chek.





The patient was given 1 dose of labetalol for some hypertension and her blood 

pressure came down to a more reasonable level.





The patient was reevaluated multiple times over multiple hours and has remained 

stable throughout her ED course.





She will be discharged home with a refill of her Metformin and her amlodipine.  

We discussed dietary changes such as decreased salt and caffeine intake, as well

 as staying away from foods that are high in sugar, carbohydrates and starches. 

 We discussed keeping a blood sugar and a blood pressure log.  She was given 

multiple outpatient referrals for primary care.


Critical Care Time: No


Critical care attestation.: 


If time is entered above; I have spent that time in minutes in the direct care 

of this critically ill patient, excluding procedure time.








ED Disposition


Clinical Impression: 


 Noncompliance with medication regimen





Uncontrolled diabetes mellitus


Qualifiers:


 Diabetes mellitus type: type 2 Glycemic state: with hyperglycemia Qualified 

Code(s): E11.65 - Type 2 diabetes mellitus with hyperglycemia





Hypertension


Qualifiers:


 Hypertension type: essential hypertension Qualified Code(s): I10 - Essential 

(primary) hypertension





Disposition: DC-01 TO HOME OR SELFCARE


Is pt being admited?: No


Condition: Stable


Instructions:  Hyperglycemia, Blood Glucose Monitoring, Adult, Hypertension, 

Adult, Diabetes Mellitus Type 2 in Adults (ED), Hypertension (ED)


Additional Instructions: 


Please follow-up with a primary care physician in the next few days.





I am restarting you on your Metformin.  Try to stay away from foods that are 

high in sugar, carbohydrates and starches.  Keep a blood sugar log.





I am restarting you on Norvasc/amlodipine.  Try to stay away from foods that are

 high in salt and caffeinated products.  Keep a blood pressure log.





Return to the emergency department with any worsening of your symptoms, new or 

concerning symptoms not addressed during this current emergency department 

visit, or with any acute distress.


Prescriptions: 


metFORMIN [Glucophage] 500 mg PO BID #60 tablet


Amlodipine Besylate [Norvasc] 5 mg PO DAILY #30 tablet


Referrals: 


PRIMARY CARE,MD [Primary Care Provider] - 3-5 Days


MARISSA WEISS MD [Staff Physician] - 3-5 Days


[facility] [Provider Group] - 3-5 Days


Time of Disposition: 18:55

## 2021-03-06 NOTE — XRAY REPORT
CHEST 1 VIEW 3/6/2021 2:15 PM



INDICATION / CLINICAL INFORMATION:

Chest pain.



COMPARISON: 

None available.



FINDINGS:



SUPPORT DEVICES: None.

HEART / MEDIASTINUM: No significant abnormality. 

LUNGS / PLEURA: Clear lungs. No significant pleural effusion. No pneumothorax. 



ADDITIONAL FINDINGS: No significant additional findings.



IMPRESSION:

1. No acute abnormality of the chest.



Signer Name: Pantrea Herrera MD 

Signed: 3/6/2021 3:18 PM

Workstation Name: [id number]